# Patient Record
(demographics unavailable — no encounter records)

---

## 2021-01-15 NOTE — RAD
US ABDOMEN LTD: 1/15/2021 6:54 AM



Indication: 25 years old Female. Right upper quadrant pain, nausea



Comparison: None.



TECHNIQUE: Sonographic evaluation of the right upper quadrant was performed utilizing grayscale and c
olor Doppler imaging.



FINDINGS:



Liver: Homogenous normal echotexture.. There is hepatopedal flow within the portal venous system. Rig
ht hepatic lobe measures 16.1 cm.



Biliary system: CBD measures 1.8 mm. There is no intrahepatic or extrahepatic biliary dilatation.



Gallbladder: There is a nonshadowing echogenic focus measuring 3 x 3 mm suggestive of a gallbladder p
olyp. Sludge is identified. No gallbladder wall thickening or pericholecystic fluid . Sonographic Mur
phy sign: Negative



Pancreas: Visualized head and uncinate process are unremarkable. Body and tail are not visualized.



Right kidney: 8.9 x 5.0 x 4.6 cm. No hydronephrosis. Normal echotexture without focal mass or renal c
alculus. 



Free fluid:None.





IMPRESSION:



1. Gallbladder polyp measures 3 mm. No sonographic evidence for acute cholecystitis. Sludge is identi
fied within the gallbladder. 6-12 month follow-up right upper quadrant ultrasound could be of benefit
.



Electronically signed by: Ambar Feliciano MD (1/15/2021 9:07 AM) [id number]

## 2021-01-15 NOTE — RAD
HEPATOBILIARY SCAN WITH EJECTION FRACTION 



History: Reason: abdomen pain, nausea and vomiting / Spl. Instructions:  / History: 



COMPARISON: Limited abdominal ultrasound, same day.



Procedure: Serial static images are obtained of the liver and biliary system in the frontal projectio
n following IV administration of 5.5 mCi of  Technetium 99m Choletec.  After filling of the gallbladd
er, 1.4 mcg of sincalide were infused over 30 minutes and dynamic imaging continued over this period.
 The gallbladder ejection fraction was calculated.



Findings: 

There is prompt hepatic clearance of tracer from the blood pool. There is homogeneous distribution th
roughout the liver. There is normal filling of the gallbladder and normal emptying into the biliary s
ystem and small bowel. The gallbladder ejection fraction measures 73% (normal gallbladder EF is 35% o
r greater).



IMPRESSION: 

1. The cystic duct and common bile duct are patent. Negative for acute cholecystitis.

2. The gallbladder ejection fraction is normal.



 



Electronically signed by: Angelito Honeycutt MD (1/15/2021 2:44 PM) [id number]